# Patient Record
(demographics unavailable — no encounter records)

---

## 2019-02-21 RX ORDER — BUPROPION HYDROCHLORIDE 150 MG/1
TABLET, EXTENDED RELEASE ORAL
Qty: 63 TABLET | Refills: 0 | OUTPATIENT
Start: 2019-02-21

## 2019-02-21 NOTE — PROGRESS NOTES
Refill Authorization Note     is requesting a refill authorization.    Brief assessment and rationale for refill: QUICK DC; inappropriate refill request  Name and strength of medication: buPROPion (WELLBUTRIN SR) 150 MG TBSR 12 hr tablet       Medication Therapy Plan: pharmacy requesting from Wrong provider; quick DC    Medication reconciliation completed: No                         Comments:   Pharmacy is inappropriately requesting  Refill from Mati Elias.    Patient has not seen Mati Elias.    Pharmacy prescriber error- appears pt has seen Mati Kumar, OB/GYN